# Patient Record
Sex: MALE | Race: BLACK OR AFRICAN AMERICAN | NOT HISPANIC OR LATINO | Employment: FULL TIME | RURAL
[De-identification: names, ages, dates, MRNs, and addresses within clinical notes are randomized per-mention and may not be internally consistent; named-entity substitution may affect disease eponyms.]

---

## 2022-01-11 ENCOUNTER — OFFICE VISIT (OUTPATIENT)
Dept: FAMILY MEDICINE | Facility: CLINIC | Age: 67
End: 2022-01-11
Payer: COMMERCIAL

## 2022-01-11 VITALS
HEART RATE: 98 BPM | DIASTOLIC BLOOD PRESSURE: 84 MMHG | HEIGHT: 74 IN | BODY MASS INDEX: 26 KG/M2 | RESPIRATION RATE: 20 BRPM | SYSTOLIC BLOOD PRESSURE: 128 MMHG | OXYGEN SATURATION: 99 % | TEMPERATURE: 97 F | WEIGHT: 202.63 LBS

## 2022-01-11 DIAGNOSIS — F17.200 SMOKER: ICD-10-CM

## 2022-01-11 DIAGNOSIS — R19.7 DIARRHEA, UNSPECIFIED TYPE: ICD-10-CM

## 2022-01-11 DIAGNOSIS — B34.9 VIRAL INFECTION: Primary | ICD-10-CM

## 2022-01-11 DIAGNOSIS — R11.0 NAUSEA: ICD-10-CM

## 2022-01-11 LAB
CTP QC/QA: YES
SARS-COV-2 AG RESP QL IA.RAPID: NEGATIVE

## 2022-01-11 PROCEDURE — 1159F PR MEDICATION LIST DOCUMENTED IN MEDICAL RECORD: ICD-10-PCS | Mod: ,,, | Performed by: NURSE PRACTITIONER

## 2022-01-11 PROCEDURE — 3074F SYST BP LT 130 MM HG: CPT | Mod: ,,, | Performed by: NURSE PRACTITIONER

## 2022-01-11 PROCEDURE — 3079F PR MOST RECENT DIASTOLIC BLOOD PRESSURE 80-89 MM HG: ICD-10-PCS | Mod: ,,, | Performed by: NURSE PRACTITIONER

## 2022-01-11 PROCEDURE — 87426 SARSCOV CORONAVIRUS AG IA: CPT | Mod: QW,,, | Performed by: NURSE PRACTITIONER

## 2022-01-11 PROCEDURE — 1159F MED LIST DOCD IN RCRD: CPT | Mod: ,,, | Performed by: NURSE PRACTITIONER

## 2022-01-11 PROCEDURE — 87426 SARS CORONAVIRUS 2 ANTIGEN POCT: ICD-10-PCS | Mod: QW,,, | Performed by: NURSE PRACTITIONER

## 2022-01-11 PROCEDURE — 3074F PR MOST RECENT SYSTOLIC BLOOD PRESSURE < 130 MM HG: ICD-10-PCS | Mod: ,,, | Performed by: NURSE PRACTITIONER

## 2022-01-11 PROCEDURE — 1126F AMNT PAIN NOTED NONE PRSNT: CPT | Mod: ,,, | Performed by: NURSE PRACTITIONER

## 2022-01-11 PROCEDURE — 3079F DIAST BP 80-89 MM HG: CPT | Mod: ,,, | Performed by: NURSE PRACTITIONER

## 2022-01-11 PROCEDURE — 99202 PR OFFICE/OUTPT VISIT, NEW, LEVL II, 15-29 MIN: ICD-10-PCS | Mod: ,,, | Performed by: NURSE PRACTITIONER

## 2022-01-11 PROCEDURE — 99202 OFFICE O/P NEW SF 15 MIN: CPT | Mod: ,,, | Performed by: NURSE PRACTITIONER

## 2022-01-11 PROCEDURE — 1126F PR PAIN SEVERITY QUANTIFIED, NO PAIN PRESENT: ICD-10-PCS | Mod: ,,, | Performed by: NURSE PRACTITIONER

## 2022-01-11 PROCEDURE — 3008F BODY MASS INDEX DOCD: CPT | Mod: ,,, | Performed by: NURSE PRACTITIONER

## 2022-01-11 PROCEDURE — 3008F PR BODY MASS INDEX (BMI) DOCUMENTED: ICD-10-PCS | Mod: ,,, | Performed by: NURSE PRACTITIONER

## 2022-01-11 NOTE — PROGRESS NOTES
[facility] ALMAGU[facility] - FAMILY MEDICINE       PATIENT NAME: Jac Guerra   : 1955    AGE: 66 y.o. DATE: 2022    MRN: [id number]        Reason for Visit / Chief Complaint: URI (Loss of appetite, diarrhea, SOB x 3 days. No fever, cough, loss of taste/smell. Vaccinated but does not have card. No known exposure. )     Subjective:     Presents as a walk-in c/o loss of appetite, intermittent SOB, fatigue, nausea, and diarrhea x 3 days. Feels much better today. Denies SOB today.  Nausea, diarrhea - denies blood in stool. Last episode of diarrhea was last night.   Works at [employer] in [place]. Wants to take the rest of the week off to get his energy back.    Received high dose flu shot at work in October.    No regular PCP  No previous screening colonoscopy. Denies FH colorectal cancer.    Review of Systems:     Review of Systems   Constitutional: Positive for fatigue. Negative for chills and fever.   HENT: Negative for congestion, ear pain, postnasal drip, rhinorrhea, sinus pain and sore throat.    Respiratory: Positive for shortness of breath. Negative for cough and wheezing.    Cardiovascular: Negative for chest pain.   Gastrointestinal: Negative for abdominal pain, diarrhea, nausea and vomiting.   Musculoskeletal: Negative for myalgias.   Skin: Negative.    Neurological: Negative for headaches.       Allergies and Medications:   Review of patient's allergies indicates:  No Known Allergies     No current outpatient medications on file prior to visit.     No current facility-administered medications on file prior to visit.       Medical/Social/Family History:   History reviewed. No pertinent past medical history.   Social History     Tobacco Use   Smoking Status Current Some Day Smoker    Types: Cigarettes   Smokeless Tobacco Never Used      Social History     Substance and Sexual Activity   Alcohol Use Yes       Family History   Problem Relation Age of Onset     "Diabetes Mother     Alcohol abuse Father       History reviewed. No pertinent surgical history.   Immunization History   Administered Date(s) Administered    COVID-19, MRNA, LN-S, PF (Pfizer) 06/05/2021, 06/26/2021          Objective:      Vitals:    01/11/22 1051   BP: 128/84   BP Location: Right arm   Patient Position: Sitting   BP Method: Large (Manual)   Pulse: 98   Resp: 20   Temp: 97.4 °F (36.3 °C)   TempSrc: Temporal   SpO2: 99%   Weight: 91.9 kg (202 lb 9.6 oz)   Height: 6' 2" (1.88 m)     Body mass index is 26.01 kg/m².     Physical Exam:    Physical Exam  Vitals and nursing note reviewed.   Constitutional:       Appearance: Normal appearance.   HENT:      Head: Normocephalic.      Right Ear: Tympanic membrane, ear canal and external ear normal.      Left Ear: Tympanic membrane, ear canal and external ear normal.      Nose: Nose normal.      Mouth/Throat:      Mouth: Mucous membranes are moist.      Pharynx: Oropharynx is clear.   Eyes:      Conjunctiva/sclera: Conjunctivae normal.      Pupils: Pupils are equal, round, and reactive to light.   Neck:      Thyroid: No thyromegaly.      Vascular: Normal carotid pulses.   Cardiovascular:      Rate and Rhythm: Normal rate and regular rhythm.      Pulses: Normal pulses.      Heart sounds: Normal heart sounds.   Pulmonary:      Effort: Pulmonary effort is normal.      Breath sounds: Normal breath sounds.   Abdominal:      General: Bowel sounds are normal.      Palpations: Abdomen is soft.      Tenderness: There is no abdominal tenderness.   Musculoskeletal:      Cervical back: Neck supple.      Right lower leg: No edema.      Left lower leg: No edema.   Lymphadenopathy:      Cervical: No cervical adenopathy.   Skin:     General: Skin is warm and dry.   Neurological:      General: No focal deficit present.      Mental Status: He is alert and oriented to person, place, and time.   Psychiatric:         Mood and Affect: Mood normal.         Behavior: Behavior " normal.         Assessment:          ICD-10-CM ICD-9-CM   1. Viral infection  B34.9 079.99   2. Nausea  R11.0 787.02   3. Diarrhea, unspecified type  R19.7 787.91   4. Smoker  F17.200 305.1        Plan:       Viral infection    Nausea  -     SARS Coronavirus 2 Antigen, POCT    Diarrhea, unspecified type    Smoker      No current outpatient medications on file.    Requested Prescriptions      No prescriptions requested or ordered in this encounter       Rapid COVID negative    Suspect viral illness that has run its course. Needs to rest and re-hydrate.   Strongly encouraged to stop smoking.  Strongly encouraged to establish PCP for check-ups and consider screening for colon cancer due to age and no prior screening. Voices understanding.    F/u as needed or if symptoms worsen or persist.    Signature: Joselin Cuellar [facility]

## 2022-01-11 NOTE — PATIENT INSTRUCTIONS
Patient Education       Preventing Falls   The Basics   Written by the doctors and editors at [facility]   Am I at risk of falling? -- Your risk of falling increases as you grow older. That's because getting older can make it harder to walk steadily and keep your balance. Also, the effects of falls are more serious in older people.  Overall, 3 to 4 out of every 10 people over the age of 65 fall each year. Up to 75 percent of people who fracture a hip never recover to the point they were before they had their fracture. If you have fallen in the past, you are at higher risk of falling again.  Several things can increase your risk of a fall, including:  · Illness  · A change in the medicines you take  · An unsafe or unfamiliar setting (for example, a room with rugs or furniture that might trip you, or an area you don't know well)  How can my doctor help me to avoid falling? -- Your doctor can talk to you about the following things:  · Past falls - It is important to tell your doctor about any times you have fallen or almost fallen. He or she can then suggest ways to prevent another fall.  · Your health conditions - Some health problems can put you at risk of falling. These include conditions that affect eyesight, hearing, muscle strength, or balance.  · The medicines you take - Certain medicines can increase the risk of falling. These include some medicines that are used for sleeping problems, anxiety, high blood pressure, or depression. Adding new medicines, or changing doses of some medicines, can also affect your risk of falling.  The more your doctor knows about your situation, the better he or she will be able to help you. For example, if you fell because you have a condition that causes pain, your doctor might suggest treatments to deal with the pain. Or if one of your medicines is making you dizzy and more likely to fall, your doctor might switch you to a different medicine.  Is there anything I can do on my  own? -- Yes. To help keep from falling, you can:  · Make your home safer - To avoid falling at home, get rid of things that might make you trip or slip. This might include furniture, electrical cords, clutter, and loose rugs (figure 1). Keep your home well-lit so that you can easily see where you are going. Avoid storing things in high places so you don't have to reach or climb.  · Wear sturdy shoes that fit well - Wearing shoes with high heels or slippery soles, or shoes that are too loose, can lead to falls. Walking around in bare feet, or only socks, can also increase your risk of falling.  · Take vitamin D pills - Taking vitamin D might lower the risk of falls in older people. This is because vitamin D helps make bones and muscles stronger. Your doctor can talk to you about whether you should take extra vitamin D, and how much.  · Stay active - Exercising on a regular basis can help lower your risk of falling. It might also help prevent you from getting hurt if you do fall. It is best to do a few different activities that help with both strength and balance. There are many kinds of exercise that can be safe for older people. These include walking, swimming, and Khris Chi (a Chinese martial art that involves slow, gentle movements).  · Use a cane, walker, and other safety devices - If your doctor recommends that you use a cane or walker, be sure that it's the right size and you know how to use it. There are other devices that might help you avoid falling, too. These include grab bars or a sturdy seat for the shower, non-slip bath mats, and hand rails or treads for the stairs (to prevent slipping).  If you worry that you could fall, there are also alarm buttons that let you call for help if you fall and can't get up.  What should I do if I fall? -- If you fall, see your doctor right away, even if you aren't hurt. Your doctor can try to figure out what caused you to fall, and how likely you are to fall again. He or  she will do an exam and talk to you about your health problems, medicines, and activities. Then he or she can suggest things you can do to avoid falling again.  Many older people have a hard time recovering after a fall. Doing things to prevent falling can help you to protect your health and independence.  All topics are updated as new evidence becomes available and our peer review process is complete.  This topic retrieved from [employer] on: Sep 21, 2021.  Topic 73350 Version 18.0  Release: 29.4.2 - C29.263  © 2021 UpToDate, Inc. and/or its affiliates. All rights reserved.  figure 1: How to avoid falling at home     This picture shows some of the things that can cause a fall in your home. Look around and remove any loose rugs, electrical cords, clutter, or furniture that could trip you.  Graphic 54418 Version 1.0    Consumer Information Use and Disclaimer   This information is not specific medical advice and does not replace information you receive from your health care provider. This is only a brief summary of general information. It does NOT include all information about conditions, illnesses, injuries, tests, procedures, treatments, therapies, discharge instructions or life-style choices that may apply to you. You must talk with your health care provider for complete information about your health and treatment options. This information should not be used to decide whether or not to accept your health care provider's advice, instructions or recommendations. Only your health care provider has the knowledge and training to provide advice that is right for you. The use of this information is governed by the [employer] End User License Agreement, available at https://www."[employer].LegalSherpa/en/solutions/AirPlug/about/pascual.The use of [employer] content is governed by the [employer] Terms of Use. ©2021 UpToDate, Inc. All rights reserved.  Copyright   © 2021 UpToDate, Inc. and/or its affiliates. All rights reserved.    Patient  Education       Quitting Smoking ED   General Information   Most of us know that smoking can cause problems to our health. Even if you know that it is bad, you continue smoking. It is because when you start to smoke it is hard to give up. You can become addicted to smoking.  Smoking is very harmful to your health. It can cause many illnesses and can affect how you look. The longer you smoke, the greater the effects on your health. It is never too late to try to quit.  As you stop smoking, it is normal to have signs of withdrawal. These will lessen over time. You may have signs like:  · Trouble sleeping.  · Feeling more hungry.  · Weight gain.  · Hard stools.  · Dizziness.  · Sore throat or cough.  · Being irritable.  · Being anxious or restless.  · Getting frustrated or angry.  · Trouble thinking clearly.  · Low mood.  Certain medicines given to you by your regular doctor can help improve these symptoms.  What care is needed at home?   · Call your regular doctor to let them know you were in the ED. Make a follow-up appointment if you were told to. They can help you stop smoking.  · Set a date to quit smoking.  · Tell your family and friends about your plan to quit smoking. Let them know how to help with your plan.  · Plan ahead about what you will do instead of smoking when you crave a cigarette.  · Remove cigarettes from your home, car, and work.  · You may want to talk with a counselor to help you learn about:  ? What triggers you to smoke.  ? How to resist cravings.  ? Things to raise your chance of quitting smoking for good.  Counseling can be in person, over the phone, in a group, online, or through text messages.  · Talk with your regular doctor about medicines to help you stop smoking.  · Exercise regularly. This may help to lower stress. Going for a walk is good exercise.  · Keep your mouth busy with sugar-free candy or gum.  · Stay away from people and places that make you want to smoke. If others close to  you smoke, ask them to quit with you or to not smoke around you.  When do I need to call the doctor?   · You have signs of low mood or depression like:  ? Feeling sad, down, hopeless, or cranky most of the day, almost every day.  ? Losing or gaining weight without trying to do so.  ? Sleeping too much or too little.  ? Feeling tired or like you have no energy.  ? Acting restless or having trouble staying still.  · You have new or worsening symptoms.  Last Reviewed Date   2021-04-13  Consumer Information Use and Disclaimer   This information is not specific medical advice and does not replace information you receive from your health care provider. This is only a brief summary of general information. It does NOT include all information about conditions, illnesses, injuries, tests, procedures, treatments, therapies, discharge instructions or life-style choices that may apply to you. You must talk with your health care provider for complete information about your health and treatment options. This information should not be used to decide whether or not to accept your health care providers advice, instructions or recommendations. Only your health care provider has the knowledge and training to provide advice that is right for you.  Copyright   Copyright © 2021 UpToDate, Inc. and its affiliates and/or licensors. All rights reserved.

## 2022-01-11 NOTE — LETTER
January 11, 2022      [facility] [address]  [place] [address]  Phone: [phone]  Fax: [phone]       Patient: Jac Guerra   YOB: 1955  Date of Visit: 01/11/2022    To Whom It May Concern:    Laura Guerra  was at [facility] on 01/11/2022. The patient may return to work/school on 01/17/2022 with no restrictions. If you have any questions or concerns, or if I can be of further assistance, please do not hesitate to contact me.    Sincerely,    DONALD Garcia

## 2024-10-15 ENCOUNTER — CLINICAL SUPPORT (OUTPATIENT)
Dept: FAMILY MEDICINE | Facility: CLINIC | Age: 69
End: 2024-10-15
Payer: MEDICARE

## 2024-10-15 DIAGNOSIS — Z23 FLU VACCINE NEED: Primary | ICD-10-CM

## 2024-10-15 PROCEDURE — 90653 IIV ADJUVANT VACCINE IM: CPT | Mod: ,,, | Performed by: NURSE PRACTITIONER

## 2024-10-15 PROCEDURE — G0008 ADMIN INFLUENZA VIRUS VAC: HCPCS | Mod: ,,, | Performed by: NURSE PRACTITIONER
